# Patient Record
(demographics unavailable — no encounter records)

---

## 2024-10-17 NOTE — HISTORY OF PRESENT ILLNESS
Sammi Oakley     [de-identified] : 10/17/2024: Pt is a 37 year old female who presents today for evaluation of her right ankle. Pt states that she twisted her ankle and heard a "pop" on 10/17/24. She is ambulating with crutches. Pain localized along the lateral/medial ankle. She took ibuprofen 800mg.  [FreeTextEntry1] : right ankle

## 2024-10-17 NOTE — ASSESSMENT
[FreeTextEntry1] : Recommend CAM boot for ambulation,  Continue crutches as needed. Ice, elevate. Mobic QD prn. OOW x 1 week. Follow up in 1-2 weeks.

## 2024-10-17 NOTE — PHYSICAL EXAM
[Right] : right foot and ankle [Moderate] : moderate swelling of lateral ankle [4___] : eversion 4[unfilled]/5 [] : ambulation with crutches [FreeTextEntry9] : limited due to pain and guarding [de-identified] : with pain

## 2024-10-17 NOTE — IMAGING
[Right] : right ankle [Degenerative change] : Degenerative change [There are no fractures, subluxations or dislocations. No significant abnormalities are seen] : There are no fractures, subluxations or dislocations. No significant abnormalities are seen [FreeTextEntry9] : calcaneal osteophye

## 2024-10-17 NOTE — PHYSICAL EXAM
[Right] : right foot and ankle [Moderate] : moderate swelling of lateral ankle [4___] : eversion 4[unfilled]/5 [] : ambulation with crutches [FreeTextEntry9] : limited due to pain and guarding [de-identified] : with pain

## 2024-10-17 NOTE — HISTORY OF PRESENT ILLNESS
[de-identified] : 10/17/2024: Pt is a 37 year old female who presents today for evaluation of her right ankle. Pt states that she twisted her ankle and heard a "pop" on 10/17/24. She is ambulating with crutches. Pain localized along the lateral/medial ankle. She took ibuprofen 800mg.  [FreeTextEntry1] : right ankle

## 2024-11-05 NOTE — HEALTH RISK ASSESSMENT
[Fair] :  ~his/her~ mood as fair [Yes] : Yes [2 - 4 times a month (2 pts)] : 2-4 times a month (2 points) [1 or 2 (0 pts)] : 1 or 2 (0 points) [Never (0 pts)] : Never (0 points) [No falls in past year] : Patient reported no falls in the past year [0] : 2) Feeling down, depressed, or hopeless: Not at all (0) [PHQ-2 Negative - No further assessment needed] : PHQ-2 Negative - No further assessment needed [Never] : Never [Patient reported mammogram was normal] : Patient reported mammogram was normal [Patient reported PAP Smear was normal] : Patient reported PAP Smear was normal [None] : None [Alone] : lives alone [Employed] : employed [] :  [Feels Safe at Home] : Feels safe at home [Fully functional (bathing, dressing, toileting, transferring, walking, feeding)] : Fully functional (bathing, dressing, toileting, transferring, walking, feeding) [Fully functional (using the telephone, shopping, preparing meals, housekeeping, doing laundry, using] : Fully functional and needs no help or supervision to perform IADLs (using the telephone, shopping, preparing meals, housekeeping, doing laundry, using transportation, managing medications and managing finances) [[id number]] : 0 [Change in mental status noted] : No change in mental status noted [Reports changes in hearing] : Reports no changes in hearing [Reports changes in vision] : Reports no changes in vision [MammogramComments] : mammo after covid vaccination following enlarged lymph node - normal. to repeat soon with GYN per patient  [PapSmearDate] : 01/23

## 2024-11-05 NOTE — REVIEW OF SYSTEMS
[Fatigue] : fatigue [Hot Flashes] : hot flashes [Joint Pain] : joint pain [Back Pain] : back pain [Negative] : Genitourinary [Fever] : no fever [Chills] : no chills [Night Sweats] : no night sweats [Recent Change In Weight] : ~T no recent weight change [FreeTextEntry2] : premenstural only [FreeTextEntry9] : premenstural only

## 2024-11-05 NOTE — PHYSICAL EXAM
[No Acute Distress] : no acute distress [Well Nourished] : well nourished [PERRL] : pupils equal round and reactive to light [EOMI] : extraocular movements intact [Thyroid Normal, No Nodules] : the thyroid was normal and there were no nodules present [No Respiratory Distress] : no respiratory distress  [No Accessory Muscle Use] : no accessory muscle use [Clear to Auscultation] : lungs were clear to auscultation bilaterally [Normal Rate] : normal rate  [Regular Rhythm] : with a regular rhythm [Normal S1, S2] : normal S1 and S2 [No Edema] : there was no peripheral edema [Grossly Normal Strength/Tone] : grossly normal strength/tone [No Rash] : no rash [Coordination Grossly Intact] : coordination grossly intact [Normal Insight/Judgement] : insight and judgment were intact

## 2024-11-05 NOTE — HISTORY OF PRESENT ILLNESS
[FreeTextEntry1] : Annual [de-identified] : Patient is a 37 year old female who presents for their annual wellness visit.   She is concerned she may be perimenopausal. She has 2 menstrual periods last month with unusally heavy/darker bleeing, and no period the month prior. She is on no hormonal BC and was previously always very regular. She has been having hot flashes with diaphoresis and very pronounced PMS symptoms to the point where she had to stay home from work one day which is unsual for her. PMH includes PCOS.   Patient has no complaints of: fever, chills, dizziness, headache, chest pain, palpitations, SOB, N/V/D, urinary symptoms, vomiting/diarrhea.

## 2025-07-16 NOTE — HISTORY OF PRESENT ILLNESS
[de-identified] : 38 yr old female presents for itchy rash ongoing for past week, off and on on posterior neck. Also wants spots checked. 5 yr old triamcinolone cream helped.

## 2025-07-16 NOTE — ASSESSMENT
[FreeTextEntry1] : #multiple nevi chronic  reassured benign  #eczematous dermatitis acute flaring - START triamcinolone 0.1% ointment BID x 2 weeks PRN flare, take 1 week break before repeated cycles PRN flare. Avoid face, axilla, groin.  SED including atrophy, dyspigmentation, telangiectasias, striae. Proper use reviewed including only using to affected area and avoidance of prolonged use.